# Patient Record
Sex: FEMALE | URBAN - METROPOLITAN AREA
[De-identification: names, ages, dates, MRNs, and addresses within clinical notes are randomized per-mention and may not be internally consistent; named-entity substitution may affect disease eponyms.]

---

## 2023-12-07 PROBLEM — 235595009: Status: ACTIVE | Noted: 2023-12-07

## 2023-12-08 ENCOUNTER — OFFICE VISIT (OUTPATIENT)
Dept: URBAN - METROPOLITAN AREA CLINIC 88 | Facility: CLINIC | Age: 63
End: 2023-12-08
Payer: MEDICARE

## 2023-12-08 ENCOUNTER — LAB OUTSIDE AN ENCOUNTER (OUTPATIENT)
Dept: URBAN - METROPOLITAN AREA CLINIC 88 | Facility: CLINIC | Age: 63
End: 2023-12-08

## 2023-12-08 ENCOUNTER — DASHBOARD ENCOUNTERS (OUTPATIENT)
Age: 63
End: 2023-12-08

## 2023-12-08 VITALS
TEMPERATURE: 97.5 F | DIASTOLIC BLOOD PRESSURE: 77 MMHG | HEART RATE: 71 BPM | SYSTOLIC BLOOD PRESSURE: 141 MMHG | HEIGHT: 61 IN | WEIGHT: 238.8 LBS | BODY MASS INDEX: 45.09 KG/M2 | OXYGEN SATURATION: 98 %

## 2023-12-08 DIAGNOSIS — Z12.11 ENCOUNTER FOR SCREENING COLONOSCOPY: ICD-10-CM

## 2023-12-08 DIAGNOSIS — K21.9 GASTROESOPHAGEAL REFLUX DISEASE, UNSPECIFIED WHETHER ESOPHAGITIS PRESENT: ICD-10-CM

## 2023-12-08 DIAGNOSIS — K44.9 HIATAL HERNIA: ICD-10-CM

## 2023-12-08 DIAGNOSIS — K59.09 OTHER CONSTIPATION: ICD-10-CM

## 2023-12-08 DIAGNOSIS — R13.19 ESOPHAGEAL DYSPHAGIA: ICD-10-CM

## 2023-12-08 DIAGNOSIS — K59.00 COLONIC CONSTIPATION: ICD-10-CM

## 2023-12-08 DIAGNOSIS — R93.3 ABNORMAL BARIUM SWALLOW: ICD-10-CM

## 2023-12-08 PROBLEM — 35298007: Status: ACTIVE | Noted: 2023-12-08

## 2023-12-08 PROCEDURE — 99204 OFFICE O/P NEW MOD 45 MIN: CPT | Performed by: NURSE PRACTITIONER

## 2023-12-08 RX ORDER — PHENTERMINE HYDROCHLORIDE 37.5 MG/1
TAKE ONE TABLET BY MOUTH ONE TIME DAILY BEFORE BREAKFAST TABLET ORAL
Qty: 30 UNSPECIFIED | Refills: 1 | Status: ACTIVE | COMMUNITY

## 2023-12-08 RX ORDER — VALSARTAN AND HYDROCHLOROTHIAZIDE 160; 25 MG/1; MG/1
TAKE ONE TABLET BY MOUTH ONE TIME DAILY TABLET, FILM COATED ORAL
Qty: 90 UNSPECIFIED | Refills: 1 | Status: ACTIVE | COMMUNITY

## 2023-12-08 RX ORDER — VERAPAMIL HYDROCHLORIDE 180 MG/1
TAKE ONE TABLET BY MOUTH ONE TIME DAILY TABLET ORAL
Qty: 90 UNSPECIFIED | Refills: 0 | Status: ACTIVE | COMMUNITY

## 2023-12-08 RX ORDER — BUPROPION HYDROCHLORIDE 300 MG/1
TAKE ONE TABLET BY MOUTH EVERY MORNING TABLET, EXTENDED RELEASE ORAL
Qty: 90 UNSPECIFIED | Refills: 0 | Status: ACTIVE | COMMUNITY

## 2023-12-08 RX ORDER — DICLOFENAC SODIUM 75 MG/1
TAKE ONE TABLET BY MOUTH TWICE A DAY AS NEEDED WITH FOOD TABLET, DELAYED RELEASE ORAL
Qty: 60 UNSPECIFIED | Refills: 1 | Status: ACTIVE | COMMUNITY

## 2023-12-08 RX ORDER — POLYETHYLENE GLYCOL 3350 17 G/DOSE
MIX ONE SCOOP IN AT LEAST 8 OZ OF WATER, TEA, COFFEE, JUICE POWDER (GRAM) ORAL
Qty: 765 GRAMS | Refills: 11 | OUTPATIENT
Start: 2023-12-08 | End: 2024-12-02

## 2023-12-08 RX ORDER — PANTOPRAZOLE SODIUM 40 MG/1
1 TABLET TABLET, DELAYED RELEASE ORAL
Qty: 90 | Refills: 1 | OUTPATIENT
Start: 2023-12-08

## 2023-12-08 RX ORDER — SEMAGLUTIDE 0.68 MG/ML
AS DIRECTED INJECTION, SOLUTION SUBCUTANEOUS
Status: ACTIVE | COMMUNITY

## 2023-12-08 RX ORDER — LEVOTHYROXINE SODIUM 150 UG/1
TAKE ONE TABLET BY MOUTH ONE TIME DAILY IN THE MORNING TABLET ORAL
Qty: 90 UNSPECIFIED | Refills: 0 | Status: ACTIVE | COMMUNITY

## 2023-12-08 NOTE — HPI-TODAY'S VISIT:
Referred by Jessica Casey NP for evaluation of abnormal barium swallow.  A copy of this note will be sent to the referring provider.  Barium swallow with tablet ordered due to c/o dysphagia.  Findings on 11/27/2023:  small sliding hiatal hernia, Schatzki's ring and GERD up to thoracic inlet. Over the last year, reports daily globus sensation and c/o dysphagia to solids and pills.  Usually increases  liquid intake to alleviate complaints.  Feels pill linger the longest after swallowing.  Denies pain with swallowing, significant hx of reflux, or esophageal dilation, having to induce vomiting, unintentional weight loss, loss of appetite.   Has not undergone prior EGD or taken antacid therapy on consistent basis.    Patient presents today to schedule screening colonoscopy.  Last colonoscopy was over 10 years ago and suspects polyps were removed.  Voices family history of IBD in her brother.  Denies family hx of colon cancer.  Patient has not had signs of rectal bleeding, changes in bowel habits, or diarrhea.  Since starting Ozempic 2-3 months ago, voices gradual onset of constipation.  Defecation occurs once every 1-2 days with stools similar to type 1 on [place] Stool Scale.

## 2023-12-20 ENCOUNTER — TELEPHONE ENCOUNTER (OUTPATIENT)
Dept: URBAN - METROPOLITAN AREA CLINIC 70 | Facility: CLINIC | Age: 63
End: 2023-12-20

## 2023-12-22 ENCOUNTER — OFFICE VISIT (OUTPATIENT)
Dept: URBAN - METROPOLITAN AREA SURGERY CENTER 24 | Facility: SURGERY CENTER | Age: 63
End: 2023-12-22